# Patient Record
Sex: MALE | Race: WHITE | NOT HISPANIC OR LATINO | URBAN - METROPOLITAN AREA
[De-identification: names, ages, dates, MRNs, and addresses within clinical notes are randomized per-mention and may not be internally consistent; named-entity substitution may affect disease eponyms.]

---

## 2022-05-18 ENCOUNTER — TELEPHONE (OUTPATIENT)
Dept: FAMILY MEDICINE CLINIC | Facility: CLINIC | Age: 32
End: 2022-05-18

## 2022-05-18 NOTE — TELEPHONE ENCOUNTER
Patient's mom called asking if patient can have a Dtap due to his sister having a premature baby  He hasn't been seen since 2016 and I did ask if he is seeing another physician and he is not  Please advise Dinorah Tariq at [phone]

## 2023-05-17 ENCOUNTER — OFFICE VISIT (OUTPATIENT)
Dept: FAMILY MEDICINE CLINIC | Facility: CLINIC | Age: 33
End: 2023-05-17

## 2023-05-17 VITALS
SYSTOLIC BLOOD PRESSURE: 144 MMHG | BODY MASS INDEX: 32.48 KG/M2 | DIASTOLIC BLOOD PRESSURE: 82 MMHG | TEMPERATURE: 97.6 F | WEIGHT: 232 LBS | HEIGHT: 71 IN | HEART RATE: 94 BPM | OXYGEN SATURATION: 99 %

## 2023-05-17 DIAGNOSIS — Z13.220 SCREENING FOR HYPERLIPIDEMIA: ICD-10-CM

## 2023-05-17 DIAGNOSIS — Z00.00 HEALTH CARE MAINTENANCE: ICD-10-CM

## 2023-05-17 DIAGNOSIS — E66.9 OBESITY (BMI 30-39.9): ICD-10-CM

## 2023-05-17 DIAGNOSIS — D17.1 LIPOMA OF CHEST WALL: ICD-10-CM

## 2023-05-17 DIAGNOSIS — R22.31 LUMP IN ARMPIT, RIGHT: Primary | ICD-10-CM

## 2023-05-17 DIAGNOSIS — R03.0 ELEVATED BP WITHOUT DIAGNOSIS OF HYPERTENSION: ICD-10-CM

## 2023-05-17 DIAGNOSIS — R06.83 SNORING: ICD-10-CM

## 2023-05-17 NOTE — PROGRESS NOTES
Name: Alok Comer      : 1990      MRN: [id number]  Encounter Provider: Anthony Choi DO  Encounter Date: 2023   Encounter department: [address] [place] [address]  Chief Complaint   Patient presents with   • New Patient Visit     Establish care, would like cyst under right arm checked, painful at times   Patient has a rubbery bump right under the skin on sternum that has been there for years he would like checked      Patient Instructions   Here to re-establish care  Check labs and also rec general Surgery consult for right armpit cyst and rec losing weight to get BMI lower and also to help with decreasing BP  Monitor BP and rec scheduling General PE  Keep right armpit area clean and dry  Discussed volumetrics diet and consider bariatrics in the future to help with weight loss  Assessment & Plan     1  Lump in armpit, right  -     Ambulatory Referral to General Surgery; Future    2  Lipoma of chest wall  -     Ambulatory Referral to General Surgery; Future    3  Obesity (BMI 30-39 9)  -     Comprehensive metabolic panel; Future  -     CBC and differential; Future  -     Lipid Panel with Direct LDL reflex; Future  -     Ambulatory Referral to Sleep Medicine; Future    4  Elevated BP without diagnosis of hypertension  -     Comprehensive metabolic panel; Future  -     CBC and differential; Future  -     Lipid Panel with Direct LDL reflex; Future  -     Ambulatory Referral to Sleep Medicine; Future    5  Snoring  -     Ambulatory Referral to Sleep Medicine; Future    6  Health care maintenance  -     Comprehensive metabolic panel; Future  -     CBC and differential; Future  -     Lipid Panel with Direct LDL reflex; Future    7  Screening for hyperlipidemia  -     Comprehensive metabolic panel; Future  -     Lipid Panel with Direct LDL reflex;  Future           Subjective      New Patient Visit (Establish care, would like cyst under right arm checked, painful at times   Patient has a "rubbery bump right under the skin on sternum that has been there for years he would like checked )  Patient is in ales and not  and has no children and non smoker  Denies fever and denies any drainage right armpit  First noticed it in January 2023 in right armpit, comes and goes and recently enlarged  Review of Systems   Constitutional: Negative  HENT: Negative  Eyes: Negative  Respiratory: Negative  Cardiovascular: Negative  Gastrointestinal: Negative  Endocrine: Negative  Genitourinary: Negative  Musculoskeletal: Negative  Skin: Negative  Allergic/Immunologic: Negative  Neurological: Negative  Hematological: Negative  Psychiatric/Behavioral: Negative  No current outpatient medications on file prior to visit  Objective     /82 (BP Location: Left arm, Patient Position: Sitting, Cuff Size: Large)   Pulse 94   Temp 97 6 °F (36 4 °C) (Temporal)   Ht 5' 10 5\" (1 791 m)   Wt 105 kg (232 lb)   SpO2 99%   BMI 32 82 kg/m²     Physical Exam  Constitutional:       Appearance: He is well-developed  He is obese  HENT:      Head: Normocephalic and atraumatic  Eyes:      Conjunctiva/sclera: Conjunctivae normal       Pupils: Pupils are equal, round, and reactive to light  Cardiovascular:      Rate and Rhythm: Normal rate and regular rhythm  Pulses: Normal pulses  Heart sounds: Normal heart sounds  Pulmonary:      Effort: Pulmonary effort is normal       Breath sounds: Normal breath sounds  Musculoskeletal:         General: Normal range of motion  Cervical back: Normal range of motion and neck supple  Skin:     General: Skin is warm and dry  Capillary Refill: Capillary refill takes less than 2 seconds  Comments: Right internal chest area lump/lipoma and also right armpit lump   Neurological:      General: No focal deficit present  Mental Status: He is alert and oriented to person, place, and time   Mental status is at " baseline  Deep Tendon Reflexes: Reflexes are normal and symmetric  Psychiatric:         Mood and Affect: Mood normal          Behavior: Behavior normal          Thought Content:  Thought content normal          Judgment: Judgment normal        Piero Dues, DO

## 2023-05-17 NOTE — PATIENT INSTRUCTIONS
Here to re-establish care  Check labs and also rec general Surgery consult for right armpit cyst and rec losing weight to get BMI lower and also to help with decreasing BP  Monitor BP and rec scheduling General PE  Keep right armpit area clean and dry  Discussed volumetrics diet and consider bariatrics in the future to help with weight loss

## 2023-05-31 ENCOUNTER — RA CDI HCC (OUTPATIENT)
Dept: OTHER | Facility: HOSPITAL | Age: 33
End: 2023-05-31

## 2023-05-31 NOTE — PROGRESS NOTES
Shamar [facility] 77  coding opportunities       Chart reviewed, no opportunity found: CHART REVIEWED, NO OPPORTUNITY FOUND   This is a reminder to address ALL HCC (risk adjustment) codes as found on active problem list for 2023 as patient scores reset to zero JODEE         Patients Insurance        Commercial Insurance: [address] [place] [address]